# Patient Record
Sex: MALE | Race: WHITE | NOT HISPANIC OR LATINO | Employment: OTHER | URBAN - METROPOLITAN AREA
[De-identification: names, ages, dates, MRNs, and addresses within clinical notes are randomized per-mention and may not be internally consistent; named-entity substitution may affect disease eponyms.]

---

## 2017-08-22 RX ORDER — CHLORAL HYDRATE 500 MG
1000 CAPSULE ORAL DAILY
COMMUNITY

## 2017-08-22 RX ORDER — ASPIRIN 81 MG/1
81 TABLET ORAL DAILY
COMMUNITY

## 2017-08-22 RX ORDER — TAMSULOSIN HYDROCHLORIDE 0.4 MG/1
0.4 CAPSULE ORAL
COMMUNITY

## 2017-08-22 RX ORDER — AMPICILLIN TRIHYDRATE 250 MG
500 CAPSULE ORAL DAILY
COMMUNITY

## 2017-08-25 ENCOUNTER — ANESTHESIA EVENT (OUTPATIENT)
Dept: PERIOP | Facility: AMBULARY SURGERY CENTER | Age: 77
End: 2017-08-25
Payer: MEDICARE

## 2017-08-28 ENCOUNTER — HOSPITAL ENCOUNTER (OUTPATIENT)
Facility: AMBULARY SURGERY CENTER | Age: 77
Setting detail: OUTPATIENT SURGERY
Discharge: HOME | End: 2017-08-28
Attending: OPHTHALMOLOGY | Admitting: OPHTHALMOLOGY
Payer: MEDICARE

## 2017-08-28 ENCOUNTER — ANESTHESIA (OUTPATIENT)
Dept: PERIOP | Facility: AMBULARY SURGERY CENTER | Age: 77
End: 2017-08-28
Payer: MEDICARE

## 2017-08-28 VITALS
SYSTOLIC BLOOD PRESSURE: 137 MMHG | OXYGEN SATURATION: 94 % | RESPIRATION RATE: 20 BRPM | TEMPERATURE: 97.5 F | WEIGHT: 225 LBS | DIASTOLIC BLOOD PRESSURE: 71 MMHG | BODY MASS INDEX: 31.38 KG/M2 | HEART RATE: 70 BPM

## 2017-08-28 PROCEDURE — V2632 POST CHMBR INTRAOCULAR LENS: HCPCS | Performed by: OPHTHALMOLOGY

## 2017-08-28 DEVICE — IOL SN60WF 20.0: Type: IMPLANTABLE DEVICE | Site: EYE | Status: FUNCTIONAL

## 2017-08-28 RX ORDER — TETRACAINE HYDROCHLORIDE 5 MG/ML
1 SOLUTION OPHTHALMIC ONCE
Status: COMPLETED | OUTPATIENT
Start: 2017-08-28 | End: 2017-08-28

## 2017-08-28 RX ORDER — GATIFLOXACIN 5 MG/ML
1 SOLUTION/ DROPS OPHTHALMIC 2 TIMES DAILY
Qty: 3 ML | Refills: 0
Start: 2017-08-28 | End: 2020-11-09

## 2017-08-28 RX ORDER — MIDAZOLAM HYDROCHLORIDE 1 MG/ML
INJECTION INTRAMUSCULAR; INTRAVENOUS AS NEEDED
Status: DISCONTINUED | OUTPATIENT
Start: 2017-08-28 | End: 2017-08-28 | Stop reason: SURG

## 2017-08-28 RX ORDER — CYCLOPENTOLATE HYDROCHLORIDE 10 MG/ML
1 SOLUTION/ DROPS OPHTHALMIC
Status: COMPLETED | OUTPATIENT
Start: 2017-08-28 | End: 2017-08-28

## 2017-08-28 RX ORDER — KETOROLAC TROMETHAMINE 5 MG/ML
1 SOLUTION OPHTHALMIC 4 TIMES DAILY
Qty: 5 ML | Refills: 0
Start: 2017-08-28 | End: 2020-11-09

## 2017-08-28 RX ORDER — PHENYLEPHRINE HCL 2.5 %
1 DROPS OPHTHALMIC (EYE)
Status: COMPLETED | OUTPATIENT
Start: 2017-08-28 | End: 2017-08-28

## 2017-08-28 RX ORDER — GATIFLOXACIN 5 MG/ML
SOLUTION/ DROPS OPHTHALMIC AS NEEDED
Status: DISCONTINUED | OUTPATIENT
Start: 2017-08-28 | End: 2017-08-28 | Stop reason: HOSPADM

## 2017-08-28 RX ORDER — TETRACAINE HYDROCHLORIDE 5 MG/ML
SOLUTION OPHTHALMIC AS NEEDED
Status: DISCONTINUED | OUTPATIENT
Start: 2017-08-28 | End: 2017-08-28 | Stop reason: HOSPADM

## 2017-08-28 RX ORDER — KETOROLAC TROMETHAMINE 5 MG/ML
1 SOLUTION OPHTHALMIC
Status: DISCONTINUED | OUTPATIENT
Start: 2017-08-28 | End: 2017-08-28 | Stop reason: HOSPADM

## 2017-08-28 RX ORDER — LIDOCAINE HYDROCHLORIDE 20 MG/ML
1 JELLY TOPICAL
Status: COMPLETED | OUTPATIENT
Start: 2017-08-28 | End: 2017-08-28

## 2017-08-28 RX ORDER — SODIUM CHLORIDE, SODIUM LACTATE, POTASSIUM CHLORIDE, CALCIUM CHLORIDE 600; 310; 30; 20 MG/100ML; MG/100ML; MG/100ML; MG/100ML
125 INJECTION, SOLUTION INTRAVENOUS CONTINUOUS
Status: DISCONTINUED | OUTPATIENT
Start: 2017-08-28 | End: 2017-08-28 | Stop reason: HOSPADM

## 2017-08-28 RX ADMIN — CYCLOPENTOLATE HYDROCHLORIDE 1 DROP: 10 SOLUTION/ DROPS OPHTHALMIC at 09:06

## 2017-08-28 RX ADMIN — CYCLOPENTOLATE HYDROCHLORIDE 1 DROP: 10 SOLUTION/ DROPS OPHTHALMIC at 08:51

## 2017-08-28 RX ADMIN — KETOROLAC TROMETHAMINE 1 DROP: 5 SOLUTION OPHTHALMIC at 08:21

## 2017-08-28 RX ADMIN — PHENYLEPHRINE HYDROCHLORIDE 1 DROP: 25 SOLUTION/ DROPS OPHTHALMIC at 09:06

## 2017-08-28 RX ADMIN — CYCLOPENTOLATE HYDROCHLORIDE 1 DROP: 10 SOLUTION/ DROPS OPHTHALMIC at 08:21

## 2017-08-28 RX ADMIN — LIDOCAINE HYDROCHLORIDE 1 APPLICATION: 20 JELLY TOPICAL at 08:21

## 2017-08-28 RX ADMIN — LIDOCAINE HYDROCHLORIDE 1 APPLICATION: 20 JELLY TOPICAL at 08:36

## 2017-08-28 RX ADMIN — KETOROLAC TROMETHAMINE 1 DROP: 5 SOLUTION OPHTHALMIC at 08:36

## 2017-08-28 RX ADMIN — LIDOCAINE HYDROCHLORIDE 1 APPLICATION: 20 JELLY TOPICAL at 09:06

## 2017-08-28 RX ADMIN — MIDAZOLAM HYDROCHLORIDE 1 MG: 1 INJECTION, SOLUTION INTRAMUSCULAR; INTRAVENOUS at 09:24

## 2017-08-28 RX ADMIN — KETOROLAC TROMETHAMINE 1 DROP: 5 SOLUTION OPHTHALMIC at 09:06

## 2017-08-28 RX ADMIN — TETRACAINE HYDROCHLORIDE 1 DROP: 5 SOLUTION OPHTHALMIC at 08:21

## 2017-08-28 RX ADMIN — PHENYLEPHRINE HYDROCHLORIDE 1 DROP: 25 SOLUTION/ DROPS OPHTHALMIC at 08:51

## 2017-08-28 RX ADMIN — KETOROLAC TROMETHAMINE 1 DROP: 5 SOLUTION OPHTHALMIC at 08:51

## 2017-08-28 RX ADMIN — CYCLOPENTOLATE HYDROCHLORIDE 1 DROP: 10 SOLUTION/ DROPS OPHTHALMIC at 08:36

## 2017-08-28 RX ADMIN — LIDOCAINE HYDROCHLORIDE 1 APPLICATION: 20 JELLY TOPICAL at 08:51

## 2017-08-28 RX ADMIN — PHENYLEPHRINE HYDROCHLORIDE 1 DROP: 25 SOLUTION/ DROPS OPHTHALMIC at 08:21

## 2017-08-28 RX ADMIN — PHENYLEPHRINE HYDROCHLORIDE 1 DROP: 25 SOLUTION/ DROPS OPHTHALMIC at 08:36

## 2018-01-12 NOTE — RESULT NOTES
Verified Results  NM PULMONARY VENTILATION / PERFUSION [id number] 10:26AM Johanna Leonardo Order Number: [id number]   Performing Comments: rule out PE   - Patient Instructions: To schedule this appointment, please contact Central Scheduling at [phone]  Test Name Result Flag Reference   NM PULMONARY VENTILATION / PERFUSION (Report)     VENTILATION AND PERFUSION SCAN      INDICATION: Shortness of breath  Recent surgery     COMPARISON:  Chest radiograph same day     TECHNIQUE:    Posterior ventilation imaging was performed after the inhalation of 8 5 mCi Xe-133 gas  Multiplanar perfusion imaging was performed following the intravenous administration of 4 4 mCi Tc-99m labeled MAA  FINDINGS:     Ventilation imaging demonstrates normal single breath, equilibration and wash-out images  Perfusion imaging demonstrates multiple bilateral wedge-shaped pleural-based perfusion deficits representing ventilation/perfusion mismatches       IMPRESSION:   IMPRESSION:     High probability of pulmonary embolism         ##imslh##imslh       Workstation performed: [id number]     Signed by:   Milena Lopez MD   3/16/16

## 2018-01-14 NOTE — MISCELLANEOUS
Message  I spoke to the patient on the phone today  His chest xray was improved but he still feels short of breath  Worse than before  Need to rule out PE Cannot have dye because of renal insufficiency  Prev creat 1 5 Ordered V Q scan   Jsk      Signatures   Electronically signed by : NARINDER Thurston ; Mar 15 2016  9:16AM EST                       (Author)

## 2018-01-15 NOTE — RESULT NOTES
Message  I spoke to nuclear medicine  Dr Derrick Donald also separately discussed with Dr Alicia Sotomayor of radiology  Patient has high probability for pulmonary embolism on ventilation perfusion scan ordered by Dr Yolanda Warren  He is currently at the [facility] [facility]  I referred the patient to the emergency department and notified Dr Rosa Draper, who is covering our group at [facility], that the patient was being referred for admission  Verified Results  NM PULMONARY VENTILATION / PERFUSION [id number] 10:26AM Keri Marivel Order Number: [id number]   Performing Comments: rule out PE   - Patient Instructions: To schedule this appointment, please contact Central Scheduling at [phone]  Test Name Result Flag Reference   NM PULMONARY VENTILATION / PERFUSION (Report)     VENTILATION AND PERFUSION SCAN      INDICATION: Shortness of breath  Recent surgery     COMPARISON:  Chest radiograph same day     TECHNIQUE:    Posterior ventilation imaging was performed after the inhalation of 8 5 mCi Xe-133 gas  Multiplanar perfusion imaging was performed following the intravenous administration of 4 4 mCi Tc-99m labeled MAA  FINDINGS:     Ventilation imaging demonstrates normal single breath, equilibration and wash-out images  Perfusion imaging demonstrates multiple bilateral wedge-shaped pleural-based perfusion deficits representing ventilation/perfusion mismatches       IMPRESSION:   IMPRESSION:     High probability of pulmonary embolism         ##imslh##imslh       Workstation performed: [id number]     Signed by:   Louis Hernandez MD   3/16/16       Signatures   Electronically signed by : NARINDER Shah ; Mar 16 2016 12:28PM EST                       (Author)

## 2019-08-07 ENCOUNTER — HOSPITAL ENCOUNTER (EMERGENCY)
Facility: HOSPITAL | Age: 79
Discharge: HOME | End: 2019-08-07
Attending: EMERGENCY MEDICINE | Admitting: EMERGENCY MEDICINE
Payer: MEDICARE

## 2019-08-07 ENCOUNTER — APPOINTMENT (EMERGENCY)
Dept: RADIOLOGY | Facility: HOSPITAL | Age: 79
End: 2019-08-07
Payer: MEDICARE

## 2019-08-07 VITALS
TEMPERATURE: 98.5 F | OXYGEN SATURATION: 95 % | BODY MASS INDEX: 33.47 KG/M2 | WEIGHT: 233.25 LBS | HEART RATE: 77 BPM | SYSTOLIC BLOOD PRESSURE: 119 MMHG | RESPIRATION RATE: 16 BRPM | DIASTOLIC BLOOD PRESSURE: 69 MMHG

## 2019-08-07 DIAGNOSIS — R07.9 CHEST PAIN, UNSPECIFIED TYPE: Primary | ICD-10-CM

## 2019-08-07 LAB
ALBUMIN SERPL BCP-MCNC: 3.7 G/DL (ref 3.5–5)
ALP SERPL-CCNC: 117 U/L (ref 46–116)
ALT SERPL W P-5'-P-CCNC: 32 U/L (ref 12–78)
ANION GAP SERPL CALCULATED.3IONS-SCNC: 13 MMOL/L (ref 4–13)
AST SERPL W P-5'-P-CCNC: 19 U/L (ref 5–45)
BASOPHILS # BLD AUTO: 0.06 THOUSANDS/ΜL (ref 0–0.1)
BASOPHILS NFR BLD AUTO: 1 % (ref 0–1)
BILIRUB SERPL-MCNC: 0.4 MG/DL (ref 0.2–1)
BUN SERPL-MCNC: 20 MG/DL (ref 5–25)
CALCIUM SERPL-MCNC: 8.9 MG/DL (ref 8.3–10.1)
CHLORIDE SERPL-SCNC: 106 MMOL/L (ref 100–108)
CO2 SERPL-SCNC: 22 MMOL/L (ref 21–32)
CREAT SERPL-MCNC: 2.19 MG/DL (ref 0.6–1.3)
EOSINOPHIL # BLD AUTO: 0.17 THOUSAND/ΜL (ref 0–0.61)
EOSINOPHIL NFR BLD AUTO: 2 % (ref 0–6)
ERYTHROCYTE [DISTWIDTH] IN BLOOD BY AUTOMATED COUNT: 13 % (ref 11.6–15.1)
GFR SERPL CREATININE-BSD FRML MDRD: 27 ML/MIN/1.73SQ M
GLUCOSE SERPL-MCNC: 113 MG/DL (ref 65–140)
HCT VFR BLD AUTO: 49.2 % (ref 36.5–49.3)
HGB BLD-MCNC: 16.6 G/DL (ref 12–17)
IMM GRANULOCYTES # BLD AUTO: 0.02 THOUSAND/UL (ref 0–0.2)
IMM GRANULOCYTES NFR BLD AUTO: 0 % (ref 0–2)
LYMPHOCYTES # BLD AUTO: 1.94 THOUSANDS/ΜL (ref 0.6–4.47)
LYMPHOCYTES NFR BLD AUTO: 25 % (ref 14–44)
MCH RBC QN AUTO: 31.5 PG (ref 26.8–34.3)
MCHC RBC AUTO-ENTMCNC: 33.7 G/DL (ref 31.4–37.4)
MCV RBC AUTO: 93 FL (ref 82–98)
MONOCYTES # BLD AUTO: 0.77 THOUSAND/ΜL (ref 0.17–1.22)
MONOCYTES NFR BLD AUTO: 10 % (ref 4–12)
NEUTROPHILS # BLD AUTO: 4.84 THOUSANDS/ΜL (ref 1.85–7.62)
NEUTS SEG NFR BLD AUTO: 62 % (ref 43–75)
NRBC BLD AUTO-RTO: 0 /100 WBCS
PLATELET # BLD AUTO: 173 THOUSANDS/UL (ref 149–390)
PMV BLD AUTO: 10.7 FL (ref 8.9–12.7)
POTASSIUM SERPL-SCNC: 4.2 MMOL/L (ref 3.5–5.3)
PROT SERPL-MCNC: 6.9 G/DL (ref 6.4–8.2)
RBC # BLD AUTO: 5.27 MILLION/UL (ref 3.88–5.62)
SODIUM SERPL-SCNC: 141 MMOL/L (ref 136–145)
TROPONIN I SERPL-MCNC: <0.02 NG/ML
WBC # BLD AUTO: 7.8 THOUSAND/UL (ref 4.31–10.16)

## 2019-08-07 PROCEDURE — 99284 EMERGENCY DEPT VISIT MOD MDM: CPT | Performed by: EMERGENCY MEDICINE

## 2019-08-07 PROCEDURE — 93005 ELECTROCARDIOGRAM TRACING: CPT

## 2019-08-07 PROCEDURE — 71046 X-RAY EXAM CHEST 2 VIEWS: CPT

## 2019-08-07 PROCEDURE — 80053 COMPREHEN METABOLIC PANEL: CPT | Performed by: EMERGENCY MEDICINE

## 2019-08-07 PROCEDURE — 99285 EMERGENCY DEPT VISIT HI MDM: CPT

## 2019-08-07 PROCEDURE — 84484 ASSAY OF TROPONIN QUANT: CPT | Performed by: EMERGENCY MEDICINE

## 2019-08-07 PROCEDURE — 36415 COLL VENOUS BLD VENIPUNCTURE: CPT | Performed by: EMERGENCY MEDICINE

## 2019-08-07 PROCEDURE — 85025 COMPLETE CBC W/AUTO DIFF WBC: CPT | Performed by: EMERGENCY MEDICINE

## 2019-08-07 RX ORDER — 0.9 % SODIUM CHLORIDE 0.9 %
3 VIAL (ML) INJECTION AS NEEDED
Status: DISCONTINUED | OUTPATIENT
Start: 2019-08-07 | End: 2019-08-07 | Stop reason: HOSPADM

## 2019-08-07 NOTE — ED NOTES
Pt remains on continuous cardiac and oxygenation monitoring       Jose D Aguirre, BALJEET  08/07/19 9772

## 2019-08-07 NOTE — ED PROVIDER NOTES
History  Chief Complaint   Patient presents with    Chest Pain     Pt  reports 4 min history of right chest wall discomfort, now pain free  No other s/s  Patient is an 25-year-old male with a history of hypertension, hyperlipidemia and GERD who presents with chest pain  [place] developed a right-sided chest pain  He is unable to qualify it but states that it felt like his gallbladder pain  He has a history of a cholecystectomy  Patient states the pain lasted for 4 minutes and resolved  It has not returned since  Pain was nonradiating and was not associated with nausea, vomiting, diaphoresis or other complaints  Patient is asymptomatic at this time  He had a negative stress test several years ago  History provided by:  Patient  Chest Pain   Pain location:  R chest  Pain radiates to:  Does not radiate  Pain radiates to the back: no    Pain severity:  Mild  Timing:  Constant  Progression:  Resolved  Chronicity:  New  Context: at rest    Ineffective treatments:  None tried  Associated symptoms: no abdominal pain, no back pain, no cough, no diaphoresis, no dizziness, no dysphagia, no fever, no headache, no lower extremity edema, no nausea, no palpitations, no shortness of breath and not vomiting        Prior to Admission Medications   Prescriptions Last Dose Informant Patient Reported? Taking? Cholecalciferol (VITAMIN D3) 2000 units CHEW   Yes No   Sig: Chew   Cinnamon 500 MG capsule   Yes No   Sig: Take 500 mg by mouth daily   Omega-3 Fatty Acids (FISH OIL) 1,000 mg   Yes No   Sig: Take 1,000 mg by mouth daily   acetaminophen (TYLENOL) 325 mg tablet   Yes No   Sig: Take 650 mg by mouth every 4 (four) hours as needed for mild pain     amLODIPine (NORVASC) 5 mg tablet   Yes No   Sig: Take 5 mg by mouth every morning     aspirin (ECOTRIN LOW STRENGTH) 81 mg EC tablet   Yes No   Sig: Take 81 mg by mouth daily   gatifloxacin (ZYMAXID) 0 5 %   No No   Sig: Administer 1 drop into the left eye 2 (two) times a day   ketorolac (ACULAR) 0 5 % ophthalmic solution   No No   Sig: Administer 1 drop into the left eye 4 (four) times a day   levothyroxine (SYNTHROID) 125 mcg tablet   Yes No   Sig: Take 150 mcg by mouth daily before breakfast      omeprazole (PriLOSEC) 20 mg delayed release capsule   Yes No   Sig: Take 20 mg by mouth every morning     pravastatin (PRAVACHOL) 40 mg tablet   Yes No   Sig: Take 40 mg by mouth every evening     tamsulosin (FLOMAX) 0 4 mg   Yes No   Sig: Take 0 4 mg by mouth daily with dinner   valsartan (DIOVAN) 320 MG tablet   Yes No   Sig: Take 320 mg by mouth every morning        Facility-Administered Medications: None       Past Medical History:   Diagnosis Date    Asthma     no meds    BPH (benign prostatic hyperplasia)     CKD (chronic kidney disease) stage 3, GFR 30-59 ml/min (HCC)     Disease of thyroid gland     GERD (gastroesophageal reflux disease)     Hepatic cyst     Hyperlipidemia     Hypertension     Shortness of breath     exertional    Thyroid cancer ([address] )     thyroid       Past Surgical History:   Procedure Laterality Date    ABDOMINAL SURGERY      CHOLECYSTECTOMY      lap    COLONOSCOPY  2016    CYSTECTOMY      liver cyst    HERNIA REPAIR Right     inguinal    LASER OF PROSTATE W/ GREEN LIGHT PVP      LIVER BIOPSY N/A 1/11/2016    Procedure: Hepatatomy, and OPEN MARSUPIALIZTION OF LIVER CYST ;  Surgeon: Madyson Kay MD;  Location:  MAIN OR;  Service:     [address] CATARACT EXTRACAP,INSERT LENS Left 8/28/2017    Procedure: EXTRACTION EXTRACAPSULAR CATARACT PHACO INTRAOCULAR LENS (IOL);   Surgeon: Judd Elder MD;  Location: [facility] MAIN OR;  Service: Ophthalmology    THYROIDECTOMY      TONSILLECTOMY         Family History   Problem Relation Age of Onset    Kidney disease Maternal Aunt     No Known Problems Mother     No Known Problems Father     Heart disease Brother     No Known Problems Sister     No Known Problems Son     No Known Problems Son      I have reviewed and agree with the history as documented  Social History     Tobacco Use    Smoking status: Never Smoker    Smokeless tobacco: Never Used   Substance Use Topics    Alcohol use: Yes     Comment: occasional    Drug use: No        Review of Systems   Constitutional: Negative for chills, diaphoresis and fever  HENT: Negative for nosebleeds, sore throat and trouble swallowing  Eyes: Negative for photophobia, pain and visual disturbance  Respiratory: Negative for cough, chest tightness and shortness of breath  Cardiovascular: Positive for chest pain  Negative for palpitations and leg swelling  Gastrointestinal: Negative for abdominal pain, constipation, diarrhea, nausea and vomiting  Endocrine: Negative for polydipsia and polyuria  Genitourinary: Negative for difficulty urinating, dysuria and hematuria  Musculoskeletal: Negative for back pain, neck pain and neck stiffness  Skin: Negative for pallor and rash  Neurological: Negative for dizziness, syncope, light-headedness and headaches  All other systems reviewed and are negative  Physical Exam  Physical Exam   Constitutional: He is oriented to person, place, and time  He appears well-developed and well-nourished  No distress  HENT:   Head: Normocephalic and atraumatic  Mouth/Throat: Oropharynx is clear and moist and mucous membranes are normal    Eyes: Pupils are equal, round, and reactive to light  EOM are normal    Neck: Normal range of motion  Neck supple  Cardiovascular: Normal rate, regular rhythm, normal heart sounds, intact distal pulses and normal pulses  Pulmonary/Chest: Effort normal and breath sounds normal  No respiratory distress  He exhibits no tenderness  Abdominal: Soft  He exhibits no distension  There is no tenderness  There is no rigidity, no rebound and no guarding  Musculoskeletal: Normal range of motion   He exhibits no edema or tenderness  Lymphadenopathy:     He has no cervical adenopathy  Neurological: He is alert and oriented to person, place, and time  He has normal strength  No cranial nerve deficit or sensory deficit  Skin: Skin is warm and dry  Capillary refill takes less than 2 seconds  Psychiatric: He has a normal mood and affect  Nursing note and vitals reviewed        Vital Signs  ED Triage Vitals [08/07/19 1352]   Temperature Pulse Respirations Blood Pressure SpO2   98 5 °F (36 9 °C) 86 18 (!) 178/93 95 %      Temp Source Heart Rate Source Patient Position - Orthostatic VS BP Location FiO2 (%)   Oral Monitor Lying Right arm --      Pain Score       No Pain           Vitals:    08/07/19 1352 08/07/19 1459   BP: (!) 178/93 119/69   Pulse: 86 77   Patient Position - Orthostatic VS: Lying Sitting         Visual Acuity      ED Medications  Medications   sodium chloride (PF) 0 9 % injection 3 mL (has no administration in time range)       Diagnostic Studies  Results Reviewed     Procedure Component Value Units Date/Time    Comprehensive metabolic panel [[id number]]  (Abnormal) Collected:  08/07/19 1502    Lab Status:  Final result Specimen:  Blood from Arm, Right Updated:  08/07/19 1533     Sodium 141 mmol/L      Potassium 4 2 mmol/L      Chloride 106 mmol/L      CO2 22 mmol/L      ANION GAP 13 mmol/L      BUN 20 mg/dL      Creatinine 2 19 mg/dL      Glucose 113 mg/dL      Calcium 8 9 mg/dL      AST 19 U/L      ALT 32 U/L      Alkaline Phosphatase 117 U/L      Total Protein 6 9 g/dL      Albumin 3 7 g/dL      Total Bilirubin 0 40 mg/dL      eGFR 27 ml/min/1 73sq m     Narrative:       [place] guidelines for Chronic Kidney Disease (CKD):     Stage 1 with normal or high GFR (GFR > 90 mL/min/1 73 square meters)    Stage 2 Mild CKD (GFR = 60-89 mL/min/1 73 square meters)    Stage 3A Moderate CKD (GFR = 45-59 mL/min/1 73 square meters)    Stage 3B Moderate CKD (GFR = 30-44 mL/min/1 73 square meters)    Stage 4 Severe CKD (GFR = 15-29 mL/min/1 73 square meters)    Stage 5 End Stage CKD (GFR <15 mL/min/1 73 square meters)  Note: GFR calculation is accurate only with a steady state creatinine    Troponin I [[id number]]  (Normal) Collected:  08/07/19 1502    Lab Status:  Final result Specimen:  Blood from Arm, Right Updated:  08/07/19 1525     Troponin I <0 02 ng/mL     CBC and differential [[id number]] Collected:  08/07/19 1502    Lab Status:  Final result Specimen:  Blood from Arm, Right Updated:  08/07/19 1509     WBC 7 80 Thousand/uL      RBC 5 27 Million/uL      Hemoglobin 16 6 g/dL      Hematocrit 49 2 %      MCV 93 fL      MCH 31 5 pg      MCHC 33 7 g/dL      RDW 13 0 %      MPV 10 7 fL      Platelets 508 Thousands/uL      nRBC 0 /100 WBCs      Neutrophils Relative 62 %      Immat GRANS % 0 %      Lymphocytes Relative 25 %      Monocytes Relative 10 %      Eosinophils Relative 2 %      Basophils Relative 1 %      Neutrophils Absolute 4 84 Thousands/µL      Immature Grans Absolute 0 02 Thousand/uL      Lymphocytes Absolute 1 94 Thousands/µL      Monocytes Absolute 0 77 Thousand/µL      Eosinophils Absolute 0 17 Thousand/µL      Basophils Absolute 0 06 Thousands/µL                  X-ray chest 2 views   ED Interpretation by Tamera Lizama DO (08/07 4759)   No infiltrates  No cardiomegaly  Final Result by Brandon Canales MD (08/07 5942)      No active pulmonary disease  Workstation performed: [id number]                    Procedures  ECG 12 Lead Documentation Only  Date/Time: 8/7/2019 2:12 PM  Performed by: Tamera Lizama DO  Authorized by: Tamera Lizama DO     ECG reviewed by me, the ED Provider: yes    Patient location:  ED  Previous ECG:     Previous ECG:  Unavailable    Comparison to cardiac monitor: Yes    Comments:      Normal sinus rhythm at a rate of 86 beats per minute  First degree AV block  Leftward axis  No ST-T wave abnormalities  No old for comparison  ED Course  ED Course as of Aug 07 1736   Wed Aug 07, 2019   1548 I had a long discussion with patient regarding discharge home verses repeat troponin  Patient is requesting discharge and will follow up with his PCP  He continues to be asymptomatic  HEART Risk Score      Most Recent Value   History  0 Filed at: 08/07/2019 1543   ECG  0 Filed at: 08/07/2019 1543   Age  2 Filed at: 08/07/2019 1543   Risk Factors  1 Filed at: 08/07/2019 1543   Troponin  0 Filed at: 08/07/2019 1543   Heart Score Risk Calculator   History  0 Filed at: 08/07/2019 1543   ECG  0 Filed at: 08/07/2019 1543   Age  2 Filed at: 08/07/2019 1543   Risk Factors  1 Filed at: 08/07/2019 1543   Troponin  0 Filed at: 08/07/2019 1543   HEART Score  3 Filed at: 08/07/2019 1543   HEART Score  3 Filed at: 08/07/2019 1543                            MDM  Number of Diagnoses or Management Options  Chest pain, unspecified type: new and requires workup  Diagnosis management comments: Patient presents with right sided chest pain which lasted approximately 4 minutes and resolved  Patient was at rest and he denies any radiation of pain or pain associated with vomiting or diaphoresis  Do not suspect ACS, aortic dissection, pneumothorax, pericardial tamponade, pulmonary embolism, esophageal rupture as cause of chest pain  HEART score completed and patient is considered low risk for adverse cardiac event  Discussed repeat troponin however patient declines  Shared decision making used and patient prefers discharge and outpatient follow up  Patient will follow up with PCP to facilitate further workup  Advised to return to ED immediately if symptoms return          Amount and/or Complexity of Data Reviewed  Clinical lab tests: ordered and reviewed  Tests in the radiology section of CPT®: ordered and reviewed  Tests in the medicine section of CPT®: ordered and reviewed  Review and summarize past medical records: yes  Independent visualization of images, tracings, or specimens: yes    Risk of Complications, Morbidity, and/or Mortality  Presenting problems: high  Diagnostic procedures: moderate  Management options: moderate    Patient Progress  Patient progress: stable      Disposition  Final diagnoses:   Chest pain, unspecified type     Time reflects when diagnosis was documented in both MDM as applicable and the Disposition within this note     Time User Action Codes Description Comment    8/7/2019  3:49 PM Brittany Abel Add [R07 9] Chest pain, unspecified type       ED Disposition     ED Disposition Condition Date/Time Comment    Discharge Stable Wed Aug 7, 2019  3:49 PM Jocelyn Xaviers Hernan discharge to home/self care              Follow-up Information     Follow up With Specialties Details Why [address] [facility] Internal Medicine Schedule an appointment as soon as possible for a visit  Return to ED sooner if symptoms return [address] [place] [address]             Discharge Medication List as of 8/7/2019  3:49 PM      CONTINUE these medications which have NOT CHANGED    Details   acetaminophen (TYLENOL) 325 mg tablet Take 650 mg by mouth every 4 (four) hours as needed for mild pain , Historical Med      amLODIPine (NORVASC) 5 mg tablet Take 5 mg by mouth every morning  , Historical Med      aspirin (ECOTRIN LOW STRENGTH) 81 mg EC tablet Take 81 mg by mouth daily, Historical Med      Cholecalciferol (VITAMIN D3) 2000 units CHEW Chew, Historical Med      Cinnamon 500 MG capsule Take 500 mg by mouth daily, Historical Med      gatifloxacin (ZYMAXID) 0 5 % Administer 1 drop into the left eye 2 (two) times a day, Starting Mon 8/28/2017, No Print      ketorolac (ACULAR) 0 5 % ophthalmic solution Administer 1 drop into the left eye 4 (four) times a day, Starting Mon 8/28/2017, No Print      levothyroxine (SYNTHROID) 125 mcg tablet Take 150 mcg by mouth daily before breakfast   , Until Discontinued, Historical Med      Omega-3 Fatty Acids (FISH OIL) 1,000 mg Take 1,000 mg by mouth daily, Historical Med      omeprazole (PriLOSEC) 20 mg delayed release capsule Take 20 mg by mouth every morning  , Historical Med      pravastatin (PRAVACHOL) 40 mg tablet Take 40 mg by mouth every evening  , Historical Med      tamsulosin (FLOMAX) 0 4 mg Take 0 4 mg by mouth daily with dinner, Historical Med      valsartan (DIOVAN) 320 MG tablet Take 320 mg by mouth every morning  , Historical Med           No discharge procedures on file      ED Provider  Electronically Signed by           Charlene Russell DO  08/07/19 1739

## 2019-08-09 LAB
ATRIAL RATE: 86 BPM
P AXIS: 50 DEGREES
PR INTERVAL: 210 MS
QRS AXIS: -2 DEGREES
QRSD INTERVAL: 86 MS
QT INTERVAL: 336 MS
QTC INTERVAL: 402 MS
T WAVE AXIS: 47 DEGREES
VENTRICULAR RATE: 86 BPM

## 2019-08-09 PROCEDURE — 93010 ELECTROCARDIOGRAM REPORT: CPT | Performed by: INTERNAL MEDICINE

## 2020-11-09 RX ORDER — FENOFIBRATE 145 MG/1
145 TABLET, COATED ORAL DAILY
COMMUNITY

## 2020-11-09 RX ORDER — ALBUTEROL SULFATE 90 UG/1
2 AEROSOL, METERED RESPIRATORY (INHALATION) EVERY 6 HOURS PRN
COMMUNITY

## 2020-11-09 RX ORDER — LOSARTAN POTASSIUM 50 MG/1
50 TABLET ORAL DAILY
COMMUNITY

## 2020-11-10 DIAGNOSIS — Z20.822 COVID-19 RULED OUT BY LABORATORY TESTING: ICD-10-CM

## 2020-11-10 PROCEDURE — U0003 INFECTIOUS AGENT DETECTION BY NUCLEIC ACID (DNA OR RNA); SEVERE ACUTE RESPIRATORY SYNDROME CORONAVIRUS 2 (SARS-COV-2) (CORONAVIRUS DISEASE [COVID-19]), AMPLIFIED PROBE TECHNIQUE, MAKING USE OF HIGH THROUGHPUT TECHNOLOGIES AS DESCRIBED BY CMS-2020-01-R: HCPCS | Performed by: OPHTHALMOLOGY

## 2020-11-11 LAB — SARS-COV-2 RNA SPEC QL NAA+PROBE: NOT DETECTED

## 2020-11-15 ENCOUNTER — ANESTHESIA EVENT (OUTPATIENT)
Dept: PERIOP | Facility: AMBULARY SURGERY CENTER | Age: 80
End: 2020-11-15
Payer: MEDICARE

## 2020-11-16 ENCOUNTER — ANESTHESIA (OUTPATIENT)
Dept: PERIOP | Facility: AMBULARY SURGERY CENTER | Age: 80
End: 2020-11-16
Payer: MEDICARE

## 2020-11-16 ENCOUNTER — HOSPITAL ENCOUNTER (OUTPATIENT)
Facility: AMBULARY SURGERY CENTER | Age: 80
Setting detail: OUTPATIENT SURGERY
Discharge: HOME | End: 2020-11-16
Attending: OPHTHALMOLOGY | Admitting: OPHTHALMOLOGY
Payer: MEDICARE

## 2020-11-16 VITALS
BODY MASS INDEX: 33.72 KG/M2 | RESPIRATION RATE: 18 BRPM | DIASTOLIC BLOOD PRESSURE: 89 MMHG | TEMPERATURE: 96.5 F | WEIGHT: 235 LBS | SYSTOLIC BLOOD PRESSURE: 144 MMHG | HEART RATE: 86 BPM | OXYGEN SATURATION: 94 %

## 2020-11-16 VITALS — HEART RATE: 85 BPM

## 2020-11-16 DIAGNOSIS — Z20.822 COVID-19 RULED OUT BY LABORATORY TESTING: Primary | ICD-10-CM

## 2020-11-16 DIAGNOSIS — H25.11 AGE-RELATED NUCLEAR CATARACT OF RIGHT EYE: ICD-10-CM

## 2020-11-16 PROCEDURE — V2632 POST CHMBR INTRAOCULAR LENS: HCPCS | Performed by: OPHTHALMOLOGY

## 2020-11-16 DEVICE — ACRYSOF(R) IQ ASPHERIC NATURAL IOL, SINGLE-PIECE ACRYLIC FOLDABLE PCL, UV WITH BLUE LIGHTFILTER, 13.0MM LENGTH, 6.0MM ANTERIORASYMMETRIC BICONVEX OPTIC, PLANAR HAPTICS.
Type: IMPLANTABLE DEVICE | Status: FUNCTIONAL
Brand: ACRYSOF®

## 2020-11-16 RX ORDER — TETRACAINE HYDROCHLORIDE 5 MG/ML
SOLUTION OPHTHALMIC AS NEEDED
Status: DISCONTINUED | OUTPATIENT
Start: 2020-11-16 | End: 2020-11-16 | Stop reason: HOSPADM

## 2020-11-16 RX ORDER — PROPOFOL 10 MG/ML
INJECTION, EMULSION INTRAVENOUS AS NEEDED
Status: DISCONTINUED | OUTPATIENT
Start: 2020-11-16 | End: 2020-11-16

## 2020-11-16 RX ORDER — BALANCED SALT SOLUTION 6.4; .75; .48; .3; 3.9; 1.7 MG/ML; MG/ML; MG/ML; MG/ML; MG/ML; MG/ML
SOLUTION OPHTHALMIC AS NEEDED
Status: DISCONTINUED | OUTPATIENT
Start: 2020-11-16 | End: 2020-11-16 | Stop reason: HOSPADM

## 2020-11-16 RX ORDER — ONDANSETRON 2 MG/ML
4 INJECTION INTRAMUSCULAR; INTRAVENOUS ONCE AS NEEDED
Status: DISCONTINUED | OUTPATIENT
Start: 2020-11-16 | End: 2020-11-16 | Stop reason: HOSPADM

## 2020-11-16 RX ORDER — CYCLOPENTOLATE HYDROCHLORIDE 10 MG/ML
1 SOLUTION/ DROPS OPHTHALMIC
Status: ACTIVE | OUTPATIENT
Start: 2020-11-16 | End: 2020-11-16

## 2020-11-16 RX ORDER — TETRACAINE HYDROCHLORIDE 5 MG/ML
1 SOLUTION OPHTHALMIC ONCE
Status: COMPLETED | OUTPATIENT
Start: 2020-11-16 | End: 2020-11-16

## 2020-11-16 RX ORDER — LIDOCAINE HYDROCHLORIDE 20 MG/ML
1 JELLY TOPICAL
Status: COMPLETED | OUTPATIENT
Start: 2020-11-16 | End: 2020-11-16

## 2020-11-16 RX ORDER — KETOROLAC TROMETHAMINE 5 MG/ML
1 SOLUTION OPHTHALMIC
Status: ACTIVE | OUTPATIENT
Start: 2020-11-16 | End: 2020-11-16

## 2020-11-16 RX ORDER — PHENYLEPHRINE HCL 2.5 %
1 DROPS OPHTHALMIC (EYE)
Status: ACTIVE | OUTPATIENT
Start: 2020-11-16 | End: 2020-11-16

## 2020-11-16 RX ORDER — SIMVASTATIN 20 MG
20 TABLET ORAL
COMMUNITY

## 2020-11-16 RX ORDER — GATIFLOXACIN 5 MG/ML
1 SOLUTION/ DROPS OPHTHALMIC 2 TIMES DAILY
Qty: 3 ML | Refills: 0
Start: 2020-11-16

## 2020-11-16 RX ORDER — KETOROLAC TROMETHAMINE 5 MG/ML
1 SOLUTION OPHTHALMIC 4 TIMES DAILY
Qty: 5 ML | Refills: 0
Start: 2020-11-16

## 2020-11-16 RX ORDER — GATIFLOXACIN 5 MG/ML
SOLUTION/ DROPS OPHTHALMIC AS NEEDED
Status: DISCONTINUED | OUTPATIENT
Start: 2020-11-16 | End: 2020-11-16 | Stop reason: HOSPADM

## 2020-11-16 RX ADMIN — PHENYLEPHRINE HYDROCHLORIDE 1 DROP: 25 SOLUTION/ DROPS OPHTHALMIC at 09:30

## 2020-11-16 RX ADMIN — PHENYLEPHRINE HYDROCHLORIDE 1 DROP: 25 SOLUTION/ DROPS OPHTHALMIC at 09:15

## 2020-11-16 RX ADMIN — KETOROLAC TROMETHAMINE 1 DROP: 5 SOLUTION OPHTHALMIC at 09:30

## 2020-11-16 RX ADMIN — LIDOCAINE HYDROCHLORIDE 1 APPLICATION: 20 JELLY TOPICAL at 09:45

## 2020-11-16 RX ADMIN — LIDOCAINE HYDROCHLORIDE 1 APPLICATION: 20 JELLY TOPICAL at 09:15

## 2020-11-16 RX ADMIN — CYCLOPENTOLATE HYDROCHLORIDE 1 DROP: 10 SOLUTION/ DROPS OPHTHALMIC at 09:30

## 2020-11-16 RX ADMIN — LIDOCAINE HYDROCHLORIDE 1 APPLICATION: 20 JELLY TOPICAL at 09:30

## 2020-11-16 RX ADMIN — CYCLOPENTOLATE HYDROCHLORIDE 1 DROP: 10 SOLUTION/ DROPS OPHTHALMIC at 09:15

## 2020-11-16 RX ADMIN — KETOROLAC TROMETHAMINE 1 DROP: 5 SOLUTION OPHTHALMIC at 09:15

## 2020-11-16 RX ADMIN — CYCLOPENTOLATE HYDROCHLORIDE 1 DROP: 10 SOLUTION/ DROPS OPHTHALMIC at 09:45

## 2020-11-16 RX ADMIN — KETOROLAC TROMETHAMINE 1 DROP: 5 SOLUTION OPHTHALMIC at 09:45

## 2020-11-16 RX ADMIN — PROPOFOL 30 MG: 10 INJECTION, EMULSION INTRAVENOUS at 09:54

## 2020-11-16 RX ADMIN — PHENYLEPHRINE HYDROCHLORIDE 1 DROP: 25 SOLUTION/ DROPS OPHTHALMIC at 09:45

## 2020-11-16 RX ADMIN — TETRACAINE HYDROCHLORIDE 1 DROP: 5 SOLUTION OPHTHALMIC at 09:15

## 2021-02-19 ENCOUNTER — TELEPHONE (OUTPATIENT)
Dept: OTHER | Facility: OTHER | Age: 81
End: 2021-02-19

## 2024-08-15 ENCOUNTER — OFFICE VISIT (OUTPATIENT)
Dept: OBGYN CLINIC | Facility: MEDICAL CENTER | Age: 84
End: 2024-08-15
Payer: COMMERCIAL

## 2024-08-15 ENCOUNTER — APPOINTMENT (OUTPATIENT)
Dept: RADIOLOGY | Facility: MEDICAL CENTER | Age: 84
End: 2024-08-15
Payer: COMMERCIAL

## 2024-08-15 VITALS
HEART RATE: 108 BPM | BODY MASS INDEX: 32.07 KG/M2 | SYSTOLIC BLOOD PRESSURE: 137 MMHG | DIASTOLIC BLOOD PRESSURE: 70 MMHG | HEIGHT: 70 IN | WEIGHT: 224 LBS | RESPIRATION RATE: 18 BRPM

## 2024-08-15 DIAGNOSIS — Z92.89 HISTORY OF MRI: ICD-10-CM

## 2024-08-15 DIAGNOSIS — M89.9 LESION OF PELVIC BONE: ICD-10-CM

## 2024-08-15 DIAGNOSIS — M25.551 RIGHT HIP PAIN: Primary | ICD-10-CM

## 2024-08-15 DIAGNOSIS — M25.551 RIGHT HIP PAIN: ICD-10-CM

## 2024-08-15 PROCEDURE — 73502 X-RAY EXAM HIP UNI 2-3 VIEWS: CPT

## 2024-08-15 PROCEDURE — 99204 OFFICE O/P NEW MOD 45 MIN: CPT | Performed by: STUDENT IN AN ORGANIZED HEALTH CARE EDUCATION/TRAINING PROGRAM

## 2024-08-15 RX ORDER — CLOTRIMAZOLE AND BETAMETHASONE DIPROPIONATE 10; .64 MG/G; MG/G
CREAM TOPICAL
COMMUNITY
Start: 2024-06-09

## 2024-08-15 RX ORDER — ALPRAZOLAM 0.5 MG
0.5 TABLET ORAL
Qty: 1 TABLET | Refills: 0 | Status: SHIPPED | OUTPATIENT
Start: 2024-08-15

## 2024-08-15 RX ORDER — FINASTERIDE 5 MG/1
5 TABLET, FILM COATED ORAL DAILY
COMMUNITY
Start: 2024-07-10

## 2024-08-15 NOTE — PROGRESS NOTES
Orthopedic Oncology Surgery Office Note  Nick Becerra (83 y.o. male)  : 1940 Encounter Date: 8/15/2024  Dr. Emre Pham DO, Orthopedic Surgeon  Orthopedic Oncology & Sarcoma Surgery   Phone:[phone] Fax:[phone]    Assessment, Plan, & Discussion:   Nick Becerra is a 83 y.o. male with:    1.  Bone lesion of right ilium  Discussed with the patient that:  - Reviewed physical exam and imaging with patient at time of visit. Radiographic findings demonstrate degeneration of his lumbar spine and osteoarthritis of the right hip.  - with incidental finding of bone lesion of right ilium, further imaging is required to ensure that this is not a malignant diagnosis  - MRI w woc contrast will be ordered once there is clearance from his nephrologist  - Call was placed to Dr. Freitas, Nephrologist for clearance for MRI with contrast  - Order for Xanax placed at time of visit for prior to MRI if open MRI is not permitted  - WBBS ordered at time of visit  - Patient will follow-up in office following completion of MRI and WBBS    PHONE CALL TO CHECK WITH NEPHROLOGIST FOR MRI WITH CONTRAST MADE      2. Stage 3 CKD  - Reviewed last note from Dr. Freitas on 24  - continue current management    #. Comorbidity, including: h/o thyroid cancer, acute respiratory failure with hypoxia  - continue current management     Surgical Planning:   Future surgical planning based on imaging results    Follow Up & Tasks:     Return for After MRI.     Tasks:  Review MRI  Review WBBS  ___________________________________________________________________________    History of Present Illness:     Nick Becerra is a 83 y.o. male with history of right hip pain who presents for consultation at the request of Alvaro Pratt MD regarding right hip pain. Patient presents today for consultation regarding lesion of right ilium and right femoral head abnormality incidentally found on spine MRI. Patient states that for  as long as he remembers he has had pain in his right leg when he would stand for too long. Patient states that he walks 3 miles a day and his hip started to ache in March 2024. He states that he has been treated with US right hip CSI injection that only provided relief of symptoms for 1 week. He states that he has started seeing spine and pain management and is set to have epidural back injections on 8/23/24.      At baseline patient gaits without assistance.  Denies constitutional symptoms such as fever, chills, night sweats, fatigue, weight gains/losses. Denies  chest pain/shortness of breath.  Patient Reports  personal history of thyroid cancer.    Occupation: Retired, prior career as [employer]    Review of Systems:   Allergies, medications, past medical/surgical/family/social history have been reviewed.  Complete 12 system review performed and found to be negative except: except as per mentioned in HPI.    Oncology and Treatment History:      Review of referring provider's records:  Referring provider: Alvaro Pratt MD  Date: 8/5/24  Impression:   Lytic bone lesion of hip    83-year-old male with a 1.5 cm lesion in the posterior aspect of the right ilium.    MRI imaging was reviewed with the patient the office today showing the lesion in the posterior aspect of the right hemipelvis. I discussed that possible etiologies could be benign hemangioma versus metastatic disease or malignant bone lesion.    I would recommend him following up with an orthopedic oncologist for further workup evaluation for the lesion in the right ilium.    Will have him follow-up with Dr. Pham at [facility] for orthopedic oncology for further workup and evaluation for his lesion in the posterior aspect of his pelvis.    Patient will continue follow-up and management with pain management and Dr. Eduardo for his lower lumbar spine.    Patient will follow-up with me on an as-needed basis.    I reviewed with patient the findings and  "treatment options, both operative and nonoperative. I answered all of patient’s questions to satisfaction. Patient was satisfied with the interview and treatment today.     Patient Care team:   Patient Care Team:  Garrett Jerez MD as PCP - General  Garrett Jerez MD as PCP - PCP-[facility] ([facility])  João Hearn MD     Oncology History    No history exists.       Physical Examination:     Height: 5' 10\" (177.8 cm)  Weight - Scale: 102 kg (224 lb)  BMI (Calculated): 32.1  BSA (Calculated - m2): 2.19 sq meters  Pain Assessment  Pain Loc: Hip     Vitals:    08/15/24 1432   BP: 137/70   Pulse: (!) 108   Resp: 18     Body mass index is 32.14 kg/m².    General: alert and oriented x 3; well nourished/well developed; no apparent distress.   Present with wife  Psychiatric: normal mood and affect  HEENT: NCAT. Head/neck - full range of motion.   Lungs: breathing comfortably; equal symmetric chest expansion.   Abdomen: soft, non-tender, non-distended.   Skin: warm; dry; no lesions, rashes, petechiae or purpura; no clubbing, no cyanosis, no edema, no palpable masses.    Extremity: Right hip/femoral head   Inspection: no edema, skin abnormalities throughout   Palpation: no palpable masses or lesions   Range of motion of joints: WNL range of motion all extremities.   Motor strength: WNL all extremities.  intact. Dorsal/Plantar flexion: intact.   Sensation: grossly intact to all extremities.    Pulses: intact   Lymphatics: (no obvious) lymphadenopathy  Gait: normal gait.      Imaging Results:   All images personally review today by Dr. Pham    Study: XR right hip  Date: 8/15/24  Report: No radiologist report was available at this time.  and I have personally reviewed the imaging in PACS and my impression is as follows:  I have personally reviewed imaging and my impression is as follows: Moderate to severe degenerative changes of the hip, no significant lysis seen      Study: MRI lumbar spine [facility]  Date: " 7/11/24  Report: I have read and agree with the radiologist report. and I have personally reviewed the imaging in PACS and my impression is as follows:  My impression is as follows:   Impression: 1. Advanced spondylosis contributing to varying degrees of spinal canal and foraminal stenosis as described above including severe spinal canal stenosis and severe right foraminal stenosis at L4-L5. 2. Signal abnormality involving the weightbearing aspect of the right femoral head which is incompletely characterized on this nondedicated study. Differential considerations include arthritis and subchondral fracture. CT and/or MRI would be of use for further characterization as clinically relevant. 3. Indeterminate 1.3 cm lesion involving the right iliac bone. Differential considerations include an atypical hemangioma and malignancy such as metastatic disease.     Study: XR right hip  Date: 2/27/24  Report: I have read and agree with the radiologist report. and I have personally reviewed the imaging in PACS and my impression is as follows:  My impression is as follows:   Impression    Findings/impression: Bones are diffusely demineralized.    Mild bilateral hip osteoarthritis. No acute fracture or malalignment. No lytic  or blastic lesions.  Partially visualized severe lower lumbar spine degenerative changes.        Pathology & Pertinent Laboratory Findings:      Pertinent laboratory findings:  N/a    Pathology:   N/a    Microbiology:  Cultures: n/a    Medical, Surgical, Family, and Social History      Past Medical History:   Diagnosis Date    Asthma     no meds    BPH (benign prostatic hyperplasia)     CKD (chronic kidney disease) stage 3, GFR 30-59 ml/min     Disease of thyroid gland     GERD (gastroesophageal reflux disease)     Hepatic cyst     Hyperlipidemia     Hypertension     PONV (postoperative nausea and vomiting)     Shortness of breath     exertional    Thyroid cancer (HCC)     thyroid     Past Surgical History:    Procedure Laterality Date    ABDOMINAL SURGERY  2017    liver cyst    CHOLECYSTECTOMY      lap    COLONOSCOPY  2016    CYSTECTOMY      liver cyst    HERNIA REPAIR Right     inguinal    LASER OF PROSTATE W/ GREEN LIGHT PVP      LIVER BIOPSY N/A 1/11/2016    Procedure: Hepatatomy, and OPEN MARSUPIALIZTION OF LIVER CYST ;  Surgeon: João Hearn MD;  Location:  MAIN OR;  Service:     FL XCAPSL CTRC RMVL INSJ IO LENS PROSTH W/O ECP Left 8/28/2017    Procedure: EXTRACTION EXTRACAPSULAR CATARACT PHACO INTRAOCULAR LENS (IOL);  Surgeon: Hans Gallegos MD;  Location: [facility] MAIN OR;  Service: Ophthalmology    FL XCAPSL CTRC RMVL INSJ IO LENS PROSTH W/O ECP Right 11/16/2020    Procedure: EXTRACTION EXTRACAPSULAR CATARACT PHACO INTRAOCULAR LENS (IOL);  Surgeon: Hans Gallegos MD;  Location: [facility] MAIN OR;  Service: Ophthalmology    THYROIDECTOMY      TONSILLECTOMY         Current Outpatient Medications:     amLODIPine (NORVASC) 5 mg tablet, Take 5 mg by mouth every morning  , Disp: , Rfl:     ascorbic acid (VITAMIN C) 1000 MG tablet, Take 1,000 mg by mouth, Disp: , Rfl:     aspirin (ECOTRIN LOW STRENGTH) 81 mg EC tablet, Take 81 mg by mouth daily, Disp: , Rfl:     Cholecalciferol (VITAMIN D3) 2000 units CHEW, Chew, Disp: , Rfl:     Cinnamon 500 MG capsule, Take 500 mg by mouth daily, Disp: , Rfl:     clotrimazole-betamethasone (LOTRISONE) 1-0.05 % cream, apply cream topically twice daily, Disp: , Rfl:     finasteride (PROSCAR) 5 mg tablet, Take 5 mg by mouth daily, Disp: , Rfl:     levothyroxine (SYNTHROID) 125 mcg tablet, Take 150 mcg by mouth daily before breakfast.  , Disp: , Rfl:     losartan (COZAAR) 50 mg tablet, Take 50 mg by mouth daily, Disp: , Rfl:     Omega-3 Fatty Acids (FISH OIL) 1,000 mg, Take 1,000 mg by mouth daily, Disp: , Rfl:     omeprazole (PriLOSEC) 20 mg delayed release capsule, Take 20 mg by mouth every morning Prn - 5mg, Disp: , Rfl:     simvastatin (ZOCOR) 20 mg tablet, Take 20 mg by mouth  daily at bedtime, Disp: , Rfl:     tamsulosin (FLOMAX) 0.4 mg, Take 0.4 mg by mouth daily with dinner, Disp: , Rfl:     albuterol (PROVENTIL HFA,VENTOLIN HFA) 90 mcg/act inhaler, Inhale 2 puffs every 6 (six) hours as needed for wheezing (Patient not taking: Reported on 8/15/2024), Disp: , Rfl:     fenofibrate (TRICOR) 145 mg tablet, Take 145 mg by mouth daily (Patient not taking: Reported on 8/15/2024), Disp: , Rfl:     gatifloxacin (ZYMAXID) 0.5 %, Administer 1 drop to the right eye 2 (two) times a day (Patient not taking: Reported on 8/15/2024), Disp: 3 mL, Rfl: 0    ketorolac (ACULAR) 0.5 % ophthalmic solution, Administer 1 drop to the right eye 4 (four) times a day (Patient not taking: Reported on 8/15/2024), Disp: 5 mL, Rfl: 0  No Known Allergies  Family History   Problem Relation Age of Onset    Kidney disease Maternal Aunt     No Known Problems Mother     No Known Problems Father     Heart disease Brother     No Known Problems Sister     No Known Problems Son     No Known Problems Son      Social History     Socioeconomic History    Marital status: /Civil Union     Spouse name: Not on file    Number of children: Not on file    Years of education: Not on file    Highest education level: Not on file   Occupational History    Not on file   Tobacco Use    Smoking status: Former     Current packs/day: 0.00     Types: Cigarettes     Quit date: 1962     Years since quittin.6    Smokeless tobacco: Never   Substance and Sexual Activity    Alcohol use: Yes     Comment: occasional    Drug use: No    Sexual activity: Not on file   Other Topics Concern    Not on file   Social History Narrative    Not on file     Social Determinants of Health     Financial Resource Strain: Not on file   Food Insecurity: Not on file   Transportation Needs: Not on file   Physical Activity: Not on file   Stress: Not on file   Social Connections: Not on file   Intimate Partner Violence: Not on file   Housing Stability: Not on  file       This Visit:     40 minutes was spent in the coordination of care, reviewing of imaging and with the patient on the date of service    Scribe Attestation      I,:  Lisa Lamb am acting as a scribe while in the presence of the attending physician.:       I,:  Emre Pham, DO personally performed the services described in this documentation    as scribed in my presence.:                  Associated Orders:     Problem List Items Addressed This Visit    None  Visit Diagnoses       Right hip pain    -  Primary    Relevant Orders    XR hip/pelv 2-3 vws right if performed    Lesion of pelvic bone        Relevant Orders    MRI hip right w wo contrast    NM bone scan whole body    History of MRI        Relevant Orders    BUN/Creatinine Ratio

## 2024-08-15 NOTE — LETTER
Dr. Pratt,  Thank you for entrusting your patient's care to me and [facility].  I am writing to inform you that I have evaluated your patient and recommended the following treatment plan for Nick Becerra :  We plan to proceed with MRI of right hip and whole body bone scan for future planning.  I am confident these actions will produce optimal patient outcomes.  I will continue to provide our mutual patient with high quality care here at [facility].    My team and I are here for any questions or concerns you may have regarding this patient or other orthopedic oncology inquiries. Please contact me at my personal cell, [phone], if needed. It has been a privilege to take care of our mutual patient's health care needs.  I greatly value our partnership and thank you for the opportunity to care for your patients.      In good health,    Emre Pham DO  Orthopedic Oncologist and Sarcoma Surgeon  [address], [address], [place], [place]  Phone: [phone] Fax: [phone]

## 2024-08-28 ENCOUNTER — HOSPITAL ENCOUNTER (OUTPATIENT)
Dept: NUCLEAR MEDICINE | Facility: HOSPITAL | Age: 84
Discharge: HOME | End: 2024-08-28
Attending: STUDENT IN AN ORGANIZED HEALTH CARE EDUCATION/TRAINING PROGRAM
Payer: COMMERCIAL

## 2024-08-28 DIAGNOSIS — M89.9 LESION OF PELVIC BONE: ICD-10-CM

## 2024-08-28 PROCEDURE — A9503 TC99M MEDRONATE: HCPCS

## 2024-08-28 PROCEDURE — 78306 BONE IMAGING WHOLE BODY: CPT

## 2024-09-20 ENCOUNTER — HOSPITAL ENCOUNTER (OUTPATIENT)
Dept: MRI IMAGING | Facility: HOSPITAL | Age: 84
Discharge: HOME | End: 2024-09-20
Attending: STUDENT IN AN ORGANIZED HEALTH CARE EDUCATION/TRAINING PROGRAM
Payer: COMMERCIAL

## 2024-09-20 DIAGNOSIS — M89.9 LESION OF PELVIC BONE: ICD-10-CM

## 2024-09-20 PROCEDURE — A9585 GADOBUTROL INJECTION: HCPCS | Performed by: STUDENT IN AN ORGANIZED HEALTH CARE EDUCATION/TRAINING PROGRAM

## 2024-09-20 PROCEDURE — 73723 MRI JOINT LWR EXTR W/O&W/DYE: CPT

## 2024-09-20 RX ORDER — GADOBUTROL 604.72 MG/ML
10 INJECTION INTRAVENOUS
Status: COMPLETED | OUTPATIENT
Start: 2024-09-20 | End: 2024-09-20

## 2024-09-20 RX ORDER — GADOBUTROL 604.72 MG/ML
10 INJECTION INTRAVENOUS
Status: CANCELLED | OUTPATIENT
Start: 2024-09-20

## 2024-09-20 RX ADMIN — GADOBUTROL 10 ML: 604.72 INJECTION INTRAVENOUS at 16:16

## 2024-10-03 VITALS
WEIGHT: 224 LBS | BODY MASS INDEX: 32.07 KG/M2 | SYSTOLIC BLOOD PRESSURE: 122 MMHG | HEART RATE: 112 BPM | DIASTOLIC BLOOD PRESSURE: 80 MMHG | HEIGHT: 70 IN

## 2024-10-03 DIAGNOSIS — M25.551 RIGHT HIP PAIN: Primary | ICD-10-CM

## 2024-10-03 DIAGNOSIS — M89.9 LESION OF PELVIC BONE: ICD-10-CM

## 2024-10-03 PROCEDURE — 99214 OFFICE O/P EST MOD 30 MIN: CPT | Performed by: STUDENT IN AN ORGANIZED HEALTH CARE EDUCATION/TRAINING PROGRAM

## 2024-10-03 RX ORDER — APIXABAN 5 MG (74)
KIT ORAL
COMMUNITY
Start: 2024-09-23

## 2024-10-03 NOTE — PROGRESS NOTES
Orthopedic Surgery Office Note  Nick Becerra (83 y.o. male)   : 1940   MRN: [id number]   Encounter Date: 10/3/2024 with Dr. Emre Pham, DO  Chief Complaint   Patient presents with    Right Hip - Follow-up     F/u  over biopsy resultes       Assessment, Plan, & Discussion:     Right hip pain  - initial MRI spine imaging could indicate benign hemangioma vs metastatic disease vs malignant bone lesion  -Lesions further characterized with MRI right hip 24: extensive tear of glenoid labrum, severe osteoarthritis right hip, right iliac bone abnormality showing area of heterogenous, hematopoietic bone marrow, no evidence suspicious bone lesions throughout pelvis and right hip  -NM bone scan (): increased activity right hip/femoral head, likely severe arthritis, no evidence abnormal activity within right iliac bone, no demonstration metastatic disease  -clinically suspect majority of pain due to hip arthritis, no concern of malignancy on imaging    Plan:   Patient maybe candidate for hip replacement following stabilization after PE  Recommend patient follow up with PCP and [facility] ortho now that malignancy ruled out  No radiographic evidence of metastatic disease on both MRI and whole-body bone scan.  No need to follow with surveilling images at this time, other pains likely not related to this  Continue tylenol for pain control  Return if symptoms worsen or fail to improve.      Surgery:   No surgery planned at this time      Orders:     There are no diagnoses linked to this encounter.     History of Present Illness:     Nick Becerra is a 83 y.o. male with pmh CKD, HTN, BPH who presents for follow up of right hip pain with lesion of right ilium and femoral head abnormality incidentally found on spine MRI 8/15. Lesions further characterized with MRI right hip 24: extensive tear of glenoid labrum, severe osteoarthritis right hip, right iliac bone abnormality showing area of  "heterogenous, hematopoietic bone marrow, no evidence suspicious bone lesions throughout pelvis and right hip. 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 Patient also completed NM bone scan 8/28 showing increased activity right hip/femoral head, likely severe arthritis, no evidence abnormal activity within right iliac bone, no demonstration metastatic disease.    Today patient reports pain is 2/10 in right hip, worse with movement. Patient denies any pain when sitting still. Pain improved since last visit. He has been walking less due to breathing problems in the setting of PE. He is taking tylenol 500 mg every 6 hours with little relief of pain. No numbness, tingling.       Review of Systems  Constitutional: Negative for fatigue, fever or loss of appetite.   HENT: Negative.    Respiratory: positive for shortness of breath, dyspnea.    Cardiovascular: Negative for chest pain/tightness.   Gastrointestinal: Negative for abdominal pain, N/V.   Endocrine: Negative for cold/heat intolerance, unexplained weight loss/gain.   Genitourinary: Negative for flank pain, dysuria  Skin: Negative for rash.    Psychiatric/Behavioral: Negative for agitation.  All else negative unless otherwise noted in HPI    Physical Exam:   General:  /80   Pulse (!) 112   Ht 5' 10\" (1.778 m)   Wt 102 kg (224 lb)   BMI 32.14 kg/m²   Cons: Appears well.  No apparent distress.  Psych: Alert. Oriented x3.  Mood and affect normal.  Eyes: PERRLA, EOMI  Resp: Normal effort.  No audible wheezing or stridor.  CV: Extremities warm and well perfused. Murmur present.   Skin: Warm. No visible lesions.  Neuro: Normal muscle tone.      Orthopedic Exam:   Right hip: right lateral hip mildly tender to palpation over region of greater trochanter, no pain with extension, flexion, adduction or abduction of hip. "       Imaging/Studies:     Study: NM bone scan   Date: 8/28/24  Report: I have read and agree with the radiologist report.  I have personally reviewed imaging and my impression is as follows: no evidence metastatic disease    Study: MRI right hip  Date: 9/20/24  Report: I have read and agree with the radiologist report.  I have personally reviewed imaging and my impression is as follows: right hip arthritis    Procedures  No procedures today.      Medical, Surgical, Family, and Social History    The patient's medical history, family history, and social history, were reviewed and updated as appropriate.    Past Medical History:   Diagnosis Date    Asthma     no meds    BPH (benign prostatic hyperplasia)     CKD (chronic kidney disease) stage 3, GFR 30-59 ml/min     Disease of thyroid gland     GERD (gastroesophageal reflux disease)     Hepatic cyst     Hyperlipidemia     Hypertension     PONV (postoperative nausea and vomiting)     Shortness of breath     exertional    Thyroid cancer (HCC)     thyroid     Past Surgical History:   Procedure Laterality Date    ABDOMINAL SURGERY  2017    liver cyst    CHOLECYSTECTOMY      lap    COLONOSCOPY  2016    CYSTECTOMY      liver cyst    HERNIA REPAIR Right     inguinal    LASER OF PROSTATE W/ GREEN LIGHT PVP      LIVER BIOPSY N/A 1/11/2016    Procedure: Hepatatomy, and OPEN MARSUPIALIZTION OF LIVER CYST ;  Surgeon: João Hearn MD;  Location:  MAIN OR;  Service:     HI XCAPSL CTRC RMVL INSJ IO LENS PROSTH W/O ECP Left 8/28/2017    Procedure: EXTRACTION EXTRACAPSULAR CATARACT PHACO INTRAOCULAR LENS (IOL);  Surgeon: Hans Gallegos MD;  Location: [facility] MAIN OR;  Service: Ophthalmology    HI XCAPSL CTRC RMVL INSJ IO LENS PROSTH W/O ECP Right 11/16/2020    Procedure: EXTRACTION EXTRACAPSULAR CATARACT PHACO INTRAOCULAR LENS (IOL);  Surgeon: Hans Gallegos MD;  Location: [facility] MAIN OR;  Service: Ophthalmology    THYROIDECTOMY      TONSILLECTOMY       Family History   Problem  Relation Age of Onset    Kidney disease Maternal Aunt     No Known Problems Mother     No Known Problems Father     Heart disease Brother     No Known Problems Sister     No Known Problems Son     No Known Problems Son      Social History     Occupational History    Not on file   Tobacco Use    Smoking status: Former     Current packs/day: 0.00     Types: Cigarettes     Quit date: 1962     Years since quittin.7    Smokeless tobacco: Never   Substance and Sexual Activity    Alcohol use: Yes     Comment: occasional    Drug use: No    Sexual activity: Not on file     No Known Allergies    Current Outpatient Medications:     amLODIPine (NORVASC) 5 mg tablet, Take 5 mg by mouth every morning  , Disp: , Rfl:     Cholecalciferol (VITAMIN D3) 2000 units CHEW, Chew, Disp: , Rfl:     Cinnamon 500 MG capsule, Take 500 mg by mouth daily, Disp: , Rfl:     clotrimazole-betamethasone (LOTRISONE) 1-0.05 % cream, apply cream topically twice daily, Disp: , Rfl:     Eliquis DVT/PE Starter Pack 5 MG TBPK, , Disp: , Rfl:     finasteride (PROSCAR) 5 mg tablet, Take 5 mg by mouth daily, Disp: , Rfl:     levothyroxine (SYNTHROID) 125 mcg tablet, Take 150 mcg by mouth daily before breakfast.  , Disp: , Rfl:     losartan (COZAAR) 50 mg tablet, Take 50 mg by mouth daily, Disp: , Rfl:     Omega-3 Fatty Acids (FISH OIL) 1,000 mg, Take 1,000 mg by mouth daily, Disp: , Rfl:     omeprazole (PriLOSEC) 20 mg delayed release capsule, Take 20 mg by mouth every morning Prn - 5mg, Disp: , Rfl:     simvastatin (ZOCOR) 20 mg tablet, Take 20 mg by mouth daily at bedtime, Disp: , Rfl:     tamsulosin (FLOMAX) 0.4 mg, Take 0.4 mg by mouth daily with dinner, Disp: , Rfl:     albuterol (PROVENTIL HFA,VENTOLIN HFA) 90 mcg/act inhaler, Inhale 2 puffs every 6 (six) hours as needed for wheezing (Patient not taking: Reported on 8/15/2024), Disp: , Rfl:     ALPRAZolam (XANAX) 0.5 mg tablet, Take 1 tablet (0.5 mg total) by mouth 30 min pre-procedure for 1  dose (Patient not taking: Reported on 10/3/2024), Disp: 1 tablet, Rfl: 0    ascorbic acid (VITAMIN C) 1000 MG tablet, Take 1,000 mg by mouth, Disp: , Rfl:     aspirin (ECOTRIN LOW STRENGTH) 81 mg EC tablet, Take 81 mg by mouth daily (Patient not taking: Reported on 10/3/2024), Disp: , Rfl:     fenofibrate (TRICOR) 145 mg tablet, Take 145 mg by mouth daily (Patient not taking: Reported on 8/15/2024), Disp: , Rfl:     gatifloxacin (ZYMAXID) 0.5 %, Administer 1 drop to the right eye 2 (two) times a day (Patient not taking: Reported on 8/15/2024), Disp: 3 mL, Rfl: 0    ketorolac (ACULAR) 0.5 % ophthalmic solution, Administer 1 drop to the right eye 4 (four) times a day (Patient not taking: Reported on 8/15/2024), Disp: 5 mL, Rfl: 0      This Visit:     30 minutes was spent in the coordination of care, reviewing of imaging and with the patient on the date of service    Mireya Ding MD    Scribe Attestation      I,:   am acting as a scribe while in the presence of the attending physician.:       I,:   personally performed the services described in this documentation    as scribed in my presence.:             Dr. Emre Pham DO, Orthopedic Surgeon  Orthopedic Oncology & Sarcoma Surgery

## (undated) DEVICE — INTREPID® TRANSFORMER IA HP: Brand: INTREPID®

## (undated) DEVICE — GLOVE SRG BIOGEL 7.5

## (undated) DEVICE — 45° KELMAN®, 0.9 MM TURBOSONICS® MINI-FLARED ABS® TIP: Brand: ALCON, KELMAN, TURBOSONICS, MINI-FLARED ABS

## (undated) DEVICE — Device: Brand: MALYUGIN RING SYSTEM 6.25MM

## (undated) DEVICE — AIR INJECT CANNULA 27GA: Brand: OPHTHALMIC CANNULA

## (undated) DEVICE — B-H IRRIGATING CAN 19GA FLAT ANGLED 8MM: Brand: OPHTHALMIC CANNULA

## (undated) DEVICE — EYE PACK CUSTOM -FINNEGAN

## (undated) DEVICE — CLEARCUT® SLIT KNIFE INTREPID MICRO-COAXIAL SYSTEM 2.4 SB: Brand: CLEARCUT®; INTREPID

## (undated) DEVICE — ACTIVE FMS W/ INTREPID* ULTRA SLEEVES, 0.9MM 45° ABS* INTREPID* BALANCED TIP: Brand: ALCON

## (undated) DEVICE — MICROSURGICAL INSTRUMENT IRR. CYSTITOME 25GA STRAIGHT-REVERSE CUTTING: Brand: ALCON

## (undated) DEVICE — GLOVE INDICATOR PI UNDERGLOVE SZ 7.5 BLUE

## (undated) DEVICE — THE MONARCH® "D" CARTRIDGE IS A SINGLE-USE POLYPROPYLENE CARTRIDGE FOR POSTERIOR CHAMBER IOL DELIVERY: Brand: MONARCH® III

## (undated) DEVICE — 0.9MM MICROSMOOTH NULTRA INFUSION SLEEVE KIT: Brand: INFINIT, MICROSMOOTH, ALCON

## (undated) DEVICE — BASIC ULTRASOUND: Brand: ALCON, INFINITI